# Patient Record
Sex: FEMALE | Race: BLACK OR AFRICAN AMERICAN | Employment: FULL TIME | URBAN - METROPOLITAN AREA
[De-identification: names, ages, dates, MRNs, and addresses within clinical notes are randomized per-mention and may not be internally consistent; named-entity substitution may affect disease eponyms.]

---

## 2019-08-07 ENCOUNTER — OFFICE VISIT (OUTPATIENT)
Dept: ORTHOPEDIC SURGERY | Age: 54
End: 2019-08-07
Payer: COMMERCIAL

## 2019-08-07 VITALS
SYSTOLIC BLOOD PRESSURE: 145 MMHG | DIASTOLIC BLOOD PRESSURE: 94 MMHG | HEART RATE: 74 BPM | HEIGHT: 64 IN | WEIGHT: 245.8 LBS | BODY MASS INDEX: 41.96 KG/M2

## 2019-08-07 DIAGNOSIS — M23.41 LOOSE BODY IN KNEE, RIGHT: Primary | ICD-10-CM

## 2019-08-07 DIAGNOSIS — M17.11 PRIMARY OSTEOARTHRITIS OF RIGHT KNEE: ICD-10-CM

## 2019-08-07 PROCEDURE — 99203 OFFICE O/P NEW LOW 30 MIN: CPT | Performed by: PHYSICIAN ASSISTANT

## 2019-08-07 RX ORDER — AMLODIPINE BESYLATE 5 MG/1
5 TABLET ORAL DAILY
COMMUNITY

## 2019-08-07 RX ORDER — IBUPROFEN 800 MG/1
800 TABLET ORAL EVERY 6 HOURS PRN
COMMUNITY
Start: 2019-08-04

## 2019-08-07 RX ORDER — PREDNISONE 20 MG/1
TABLET ORAL
Status: ON HOLD | COMMUNITY
Start: 2019-08-04 | End: 2019-08-14

## 2019-08-07 RX ORDER — HYDROCODONE BITARTRATE AND ACETAMINOPHEN 5; 325 MG/1; MG/1
1 TABLET ORAL DAILY PRN
COMMUNITY
Start: 2019-08-04 | End: 2019-08-13

## 2019-08-07 ASSESSMENT — ENCOUNTER SYMPTOMS
NAUSEA: 0
DIARRHEA: 0
CHEST TIGHTNESS: 0
ABDOMINAL PAIN: 0
SHORTNESS OF BREATH: 0
COUGH: 0
VOMITING: 0
ABDOMINAL DISTENTION: 0
COLOR CHANGE: 0
CONSTIPATION: 0
APNEA: 0

## 2019-08-07 NOTE — PROGRESS NOTES
and shortness of breath. Cardiovascular: Negative for chest pain, palpitations and leg swelling. Gastrointestinal: Negative for abdominal distention, abdominal pain, constipation, diarrhea, nausea and vomiting. Genitourinary: Negative for difficulty urinating, dysuria and hematuria. Musculoskeletal: Positive for arthralgias and joint swelling. Negative for gait problem and myalgias. Skin: Negative for color change and rash. Neurological: Negative for dizziness, weakness, numbness and headaches. Psychiatric/Behavioral: Positive for sleep disturbance. Past Medical History:    History reviewed. No pertinent past medical history. Past Surgical History:    History reviewed. No pertinent surgical history. CurrentMedications:   Current Outpatient Medications   Medication Sig Dispense Refill    HYDROcodone-acetaminophen (NORCO) 5-325 MG per tablet Take 1 tablet by mouth daily as needed.  Bictegravir-Emtricitab-Tenofov (BIKTARVY) -25 MG TABS Take 1 tablet by mouth      ibuprofen (ADVIL;MOTRIN) 800 MG tablet Take 800 mg by mouth      predniSONE (DELTASONE) 20 MG tablet 60mg x 3d, 40mg x3d, 20mg x3d, 10mg x4d      amLODIPine (NORVASC) 5 MG tablet Take 5 mg by mouth daily       No current facility-administered medications for this visit. Allergies:    Patient has no known allergies.     Social History:   Social History     Socioeconomic History    Marital status:      Spouse name: None    Number of children: None    Years of education: None    Highest education level: None   Occupational History    None   Social Needs    Financial resource strain: None    Food insecurity:     Worry: None     Inability: None    Transportation needs:     Medical: None     Non-medical: None   Tobacco Use    Smoking status: Never Smoker    Smokeless tobacco: Never Used   Substance and Sexual Activity    Alcohol use: None    Drug use: None    Sexual activity: None   Lifestyle   

## 2019-08-08 ENCOUNTER — HOSPITAL ENCOUNTER (OUTPATIENT)
Dept: MRI IMAGING | Age: 54
Discharge: HOME | End: 2019-08-10
Payer: COMMERCIAL

## 2019-08-08 DIAGNOSIS — M23.41 LOOSE BODY IN KNEE, RIGHT: ICD-10-CM

## 2019-08-08 PROCEDURE — 73721 MRI JNT OF LWR EXTRE W/O DYE: CPT

## 2019-08-12 ENCOUNTER — OFFICE VISIT (OUTPATIENT)
Dept: ORTHOPEDIC SURGERY | Age: 54
End: 2019-08-12
Payer: COMMERCIAL

## 2019-08-12 VITALS
DIASTOLIC BLOOD PRESSURE: 82 MMHG | WEIGHT: 245.6 LBS | HEART RATE: 67 BPM | HEIGHT: 64 IN | BODY MASS INDEX: 41.93 KG/M2 | SYSTOLIC BLOOD PRESSURE: 131 MMHG

## 2019-08-12 DIAGNOSIS — M17.11 PRIMARY OSTEOARTHRITIS OF RIGHT KNEE: ICD-10-CM

## 2019-08-12 DIAGNOSIS — M23.41 LOOSE BODY IN KNEE, RIGHT: Primary | ICD-10-CM

## 2019-08-12 PROCEDURE — 99214 OFFICE O/P EST MOD 30 MIN: CPT | Performed by: ORTHOPAEDIC SURGERY

## 2019-08-12 ASSESSMENT — ENCOUNTER SYMPTOMS
ABDOMINAL PAIN: 0
SHORTNESS OF BREATH: 0
DIARRHEA: 0
COUGH: 0
CONSTIPATION: 0
CHEST TIGHTNESS: 0
COLOR CHANGE: 0
NAUSEA: 0
APNEA: 0
VOMITING: 0
ABDOMINAL DISTENTION: 0
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
## 2019-08-13 ENCOUNTER — HOSPITAL ENCOUNTER (OUTPATIENT)
Dept: PREADMISSION TESTING | Age: 54
Discharge: HOME | End: 2019-08-17
Payer: COMMERCIAL

## 2019-08-13 VITALS
WEIGHT: 245.59 LBS | HEIGHT: 64 IN | DIASTOLIC BLOOD PRESSURE: 91 MMHG | BODY MASS INDEX: 41.93 KG/M2 | HEART RATE: 69 BPM | SYSTOLIC BLOOD PRESSURE: 155 MMHG | RESPIRATION RATE: 16 BRPM | OXYGEN SATURATION: 100 %

## 2019-08-13 LAB
ABSOLUTE EOS #: 0.05 K/UL (ref 0–0.44)
ABSOLUTE IMMATURE GRANULOCYTE: 0.02 K/UL (ref 0–0.3)
ABSOLUTE LYMPH #: 2.7 K/UL (ref 1.1–3.7)
ABSOLUTE MONO #: 0.54 K/UL (ref 0.1–1.2)
ANION GAP SERPL CALCULATED.3IONS-SCNC: 11 MMOL/L (ref 9–17)
BASOPHILS # BLD: 1 % (ref 0–2)
BASOPHILS ABSOLUTE: 0.03 K/UL (ref 0–0.2)
BUN BLDV-MCNC: 18 MG/DL (ref 6–20)
CHLORIDE BLD-SCNC: 101 MMOL/L (ref 98–107)
CO2: 28 MMOL/L (ref 20–31)
CREAT SERPL-MCNC: 0.71 MG/DL (ref 0.5–0.9)
DIFFERENTIAL TYPE: NORMAL
EOSINOPHILS RELATIVE PERCENT: 1 % (ref 1–4)
GFR AFRICAN AMERICAN: >60 ML/MIN
GFR NON-AFRICAN AMERICAN: >60 ML/MIN
GFR SERPL CREATININE-BSD FRML MDRD: NORMAL ML/MIN/{1.73_M2}
GFR SERPL CREATININE-BSD FRML MDRD: NORMAL ML/MIN/{1.73_M2}
HCT VFR BLD CALC: 40 % (ref 36.3–47.1)
HEMOGLOBIN: 13.1 G/DL (ref 11.9–15.1)
IMMATURE GRANULOCYTES: 0 %
LYMPHOCYTES # BLD: 41 % (ref 24–43)
MCH RBC QN AUTO: 31.4 PG (ref 25.2–33.5)
MCHC RBC AUTO-ENTMCNC: 32.8 G/DL (ref 28.4–34.8)
MCV RBC AUTO: 95.9 FL (ref 82.6–102.9)
MONOCYTES # BLD: 8 % (ref 3–12)
NRBC AUTOMATED: 0 PER 100 WBC
PDW BLD-RTO: 12.8 % (ref 11.8–14.4)
PLATELET # BLD: 298 K/UL (ref 138–453)
PLATELET ESTIMATE: NORMAL
PMV BLD AUTO: 9.7 FL (ref 8.1–13.5)
POTASSIUM SERPL-SCNC: 3.3 MMOL/L (ref 3.7–5.3)
RBC # BLD: 4.17 M/UL (ref 3.95–5.11)
RBC # BLD: NORMAL 10*6/UL
SEG NEUTROPHILS: 49 % (ref 36–65)
SEGMENTED NEUTROPHILS ABSOLUTE COUNT: 3.21 K/UL (ref 1.5–8.1)
SODIUM BLD-SCNC: 140 MMOL/L (ref 135–144)
WBC # BLD: 6.6 K/UL (ref 3.5–11.3)
WBC # BLD: NORMAL 10*3/UL

## 2019-08-13 PROCEDURE — 85025 COMPLETE CBC W/AUTO DIFF WBC: CPT

## 2019-08-13 PROCEDURE — 82565 ASSAY OF CREATININE: CPT

## 2019-08-13 PROCEDURE — 80051 ELECTROLYTE PANEL: CPT

## 2019-08-13 PROCEDURE — 36415 COLL VENOUS BLD VENIPUNCTURE: CPT

## 2019-08-13 PROCEDURE — 93005 ELECTROCARDIOGRAM TRACING: CPT | Performed by: ANESTHESIOLOGY

## 2019-08-13 PROCEDURE — 84520 ASSAY OF UREA NITROGEN: CPT

## 2019-08-13 ASSESSMENT — PAIN - FUNCTIONAL ASSESSMENT: PAIN_FUNCTIONAL_ASSESSMENT: PREVENTS OR INTERFERES SOME ACTIVE ACTIVITIES AND ADLS

## 2019-08-13 ASSESSMENT — PAIN DESCRIPTION - FREQUENCY: FREQUENCY: CONTINUOUS

## 2019-08-13 ASSESSMENT — PAIN DESCRIPTION - DESCRIPTORS: DESCRIPTORS: SHARP;STABBING;SHOOTING

## 2019-08-13 ASSESSMENT — PAIN DESCRIPTION - LOCATION: LOCATION: KNEE

## 2019-08-13 ASSESSMENT — PAIN DESCRIPTION - ONSET: ONSET: AWAKENED FROM SLEEP

## 2019-08-13 ASSESSMENT — PAIN DESCRIPTION - PAIN TYPE: TYPE: ACUTE PAIN

## 2019-08-13 ASSESSMENT — PAIN DESCRIPTION - ORIENTATION: ORIENTATION: RIGHT

## 2019-08-13 ASSESSMENT — PAIN SCALES - GENERAL: PAINLEVEL_OUTOF10: 8

## 2019-08-13 ASSESSMENT — PAIN DESCRIPTION - PROGRESSION: CLINICAL_PROGRESSION: GRADUALLY WORSENING

## 2019-08-13 NOTE — PRE-PROCEDURE INSTRUCTIONS
[facility] ON Friday, August 16th at 06:00 AM    Once you enter the hospital lobby, take the elevators to the second floor. Check-In is at the surgery registration desk. If you have been given a blood band, you must bring it with you the day of surgery. Continue to take your home medications as you normally do up to and including the night before surgery with the exception of any blood thinning medications. Please stop any blood thinning medications as directed by your surgeon or prescribing physician. Failure to stop certain medications may interfere with your scheduled surgery. These may include:  Aspirin, Warfarin (Coumadin), Clopidogrel (Plavix), Ibuprofen (Motrin, Advil), Naproxen (Aleve), Meloxicam (Mobic), Celecoxib (Celebrex), Eliquis, Pradaxa, Xarelto, Effient, Fish Oil, Herbal supplements. If you are diabetic, do not take any of your diabetic medications by mouth the morning of surgery. If you are taking insulin contact the doctor that manages your diabetes for instructions about any changes to your insulin dosages the day before surgery. Do not inject insulin or other injectable diabetic medications the morning of surgery unless otherwise instructed by the doctor who manages your diabetes. Please take the following medication(s) the day of surgery with a small sip of water:  amlodipine  Please use your inhalers at home the day of surgery. PREPARING FOR YOUR SURGERY:     Before surgery, you can play an important role in your own health. Because skin is not sterile, we need to be sure that your skin is as free of germs as possible before surgery by carefully washing before surgery. Preparing or prepping skin before surgery can reduce the risk of a surgical site infection.   Do not shave the area of your body where your surgery will be performed unless you received specific permission from your physician.     You will need to shower at home the night before
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
## 2019-08-14 ENCOUNTER — HOSPITAL ENCOUNTER (OUTPATIENT)
Age: 54
Setting detail: OUTPATIENT SURGERY
Discharge: HOME | End: 2019-08-14
Attending: ORTHOPAEDIC SURGERY | Admitting: ORTHOPAEDIC SURGERY
Payer: COMMERCIAL

## 2019-08-14 ENCOUNTER — ANESTHESIA EVENT (OUTPATIENT)
Dept: OPERATING ROOM | Age: 54
End: 2019-08-14
Payer: COMMERCIAL

## 2019-08-14 ENCOUNTER — APPOINTMENT (OUTPATIENT)
Dept: GENERAL RADIOLOGY | Age: 54
End: 2019-08-14
Attending: ORTHOPAEDIC SURGERY
Payer: COMMERCIAL

## 2019-08-14 ENCOUNTER — ANESTHESIA (OUTPATIENT)
Dept: OPERATING ROOM | Age: 54
End: 2019-08-14
Payer: COMMERCIAL

## 2019-08-14 VITALS
DIASTOLIC BLOOD PRESSURE: 78 MMHG | OXYGEN SATURATION: 99 % | RESPIRATION RATE: 17 BRPM | HEART RATE: 77 BPM | TEMPERATURE: 97 F | BODY MASS INDEX: 41.83 KG/M2 | HEIGHT: 64 IN | SYSTOLIC BLOOD PRESSURE: 132 MMHG | WEIGHT: 245 LBS

## 2019-08-14 VITALS — TEMPERATURE: 98.4 F | SYSTOLIC BLOOD PRESSURE: 116 MMHG | DIASTOLIC BLOOD PRESSURE: 60 MMHG | OXYGEN SATURATION: 99 %

## 2019-08-14 DIAGNOSIS — G89.18 POST-OP PAIN: Primary | ICD-10-CM

## 2019-08-14 LAB
EKG ATRIAL RATE: 70 BPM
EKG P AXIS: 58 DEGREES
EKG P-R INTERVAL: 168 MS
EKG Q-T INTERVAL: 396 MS
EKG QRS DURATION: 88 MS
EKG QTC CALCULATION (BAZETT): 427 MS
EKG R AXIS: 41 DEGREES
EKG T AXIS: 45 DEGREES
EKG VENTRICULAR RATE: 70 BPM

## 2019-08-14 PROCEDURE — 7100000011 HC PHASE II RECOVERY - ADDTL 15 MIN: Performed by: ORTHOPAEDIC SURGERY

## 2019-08-14 PROCEDURE — 3700000001 HC ADD 15 MINUTES (ANESTHESIA): Performed by: ORTHOPAEDIC SURGERY

## 2019-08-14 PROCEDURE — 2500000003 HC RX 250 WO HCPCS: Performed by: NURSE ANESTHETIST, CERTIFIED REGISTERED

## 2019-08-14 PROCEDURE — 73560 X-RAY EXAM OF KNEE 1 OR 2: CPT

## 2019-08-14 PROCEDURE — 6360000002 HC RX W HCPCS: Performed by: ORTHOPAEDIC SURGERY

## 2019-08-14 PROCEDURE — 7100000010 HC PHASE II RECOVERY - FIRST 15 MIN: Performed by: ORTHOPAEDIC SURGERY

## 2019-08-14 PROCEDURE — 2580000003 HC RX 258: Performed by: NURSE ANESTHETIST, CERTIFIED REGISTERED

## 2019-08-14 PROCEDURE — 29874 ARTHRS KNEE SURG RMV LOOS/FB: CPT | Performed by: ORTHOPAEDIC SURGERY

## 2019-08-14 PROCEDURE — 3600000013 HC SURGERY LEVEL 3 ADDTL 15MIN: Performed by: ORTHOPAEDIC SURGERY

## 2019-08-14 PROCEDURE — 2709999900 HC NON-CHARGEABLE SUPPLY: Performed by: ORTHOPAEDIC SURGERY

## 2019-08-14 PROCEDURE — 6370000000 HC RX 637 (ALT 250 FOR IP): Performed by: ANESTHESIOLOGY

## 2019-08-14 PROCEDURE — 6360000002 HC RX W HCPCS: Performed by: NURSE ANESTHETIST, CERTIFIED REGISTERED

## 2019-08-14 PROCEDURE — 7100000001 HC PACU RECOVERY - ADDTL 15 MIN: Performed by: ORTHOPAEDIC SURGERY

## 2019-08-14 PROCEDURE — 29879 ARTHRS KNE SRG ABRASJ ARTHRP: CPT | Performed by: ORTHOPAEDIC SURGERY

## 2019-08-14 PROCEDURE — 2580000003 HC RX 258: Performed by: ANESTHESIOLOGY

## 2019-08-14 PROCEDURE — 2580000003 HC RX 258: Performed by: ORTHOPAEDIC SURGERY

## 2019-08-14 PROCEDURE — 6360000002 HC RX W HCPCS: Performed by: ANESTHESIOLOGY

## 2019-08-14 PROCEDURE — 7100000000 HC PACU RECOVERY - FIRST 15 MIN: Performed by: ORTHOPAEDIC SURGERY

## 2019-08-14 PROCEDURE — 3700000000 HC ANESTHESIA ATTENDED CARE: Performed by: ORTHOPAEDIC SURGERY

## 2019-08-14 PROCEDURE — 3600000003 HC SURGERY LEVEL 3 BASE: Performed by: ORTHOPAEDIC SURGERY

## 2019-08-14 RX ORDER — SODIUM CHLORIDE, SODIUM LACTATE, POTASSIUM CHLORIDE, CALCIUM CHLORIDE 600; 310; 30; 20 MG/100ML; MG/100ML; MG/100ML; MG/100ML
INJECTION, SOLUTION INTRAVENOUS CONTINUOUS
Status: DISCONTINUED | OUTPATIENT
Start: 2019-08-14 | End: 2019-08-14 | Stop reason: HOSPADM

## 2019-08-14 RX ORDER — FENTANYL CITRATE 50 UG/ML
INJECTION, SOLUTION INTRAMUSCULAR; INTRAVENOUS PRN
Status: DISCONTINUED | OUTPATIENT
Start: 2019-08-14 | End: 2019-08-14 | Stop reason: SDUPTHER

## 2019-08-14 RX ORDER — FENTANYL CITRATE 50 UG/ML
25 INJECTION, SOLUTION INTRAMUSCULAR; INTRAVENOUS EVERY 5 MIN PRN
Status: DISCONTINUED | OUTPATIENT
Start: 2019-08-14 | End: 2019-08-14 | Stop reason: HOSPADM

## 2019-08-14 RX ORDER — DEXAMETHASONE SODIUM PHOSPHATE 10 MG/ML
INJECTION INTRAMUSCULAR; INTRAVENOUS PRN
Status: DISCONTINUED | OUTPATIENT
Start: 2019-08-14 | End: 2019-08-14 | Stop reason: SDUPTHER

## 2019-08-14 RX ORDER — FENTANYL CITRATE 50 UG/ML
50 INJECTION, SOLUTION INTRAMUSCULAR; INTRAVENOUS EVERY 5 MIN PRN
Status: DISCONTINUED | OUTPATIENT
Start: 2019-08-14 | End: 2019-08-14 | Stop reason: HOSPADM

## 2019-08-14 RX ORDER — LIDOCAINE HYDROCHLORIDE 20 MG/ML
INJECTION, SOLUTION EPIDURAL; INFILTRATION; INTRACAUDAL; PERINEURAL PRN
Status: DISCONTINUED | OUTPATIENT
Start: 2019-08-14 | End: 2019-08-14 | Stop reason: SDUPTHER

## 2019-08-14 RX ORDER — ONDANSETRON 2 MG/ML
INJECTION INTRAMUSCULAR; INTRAVENOUS PRN
Status: DISCONTINUED | OUTPATIENT
Start: 2019-08-14 | End: 2019-08-14 | Stop reason: SDUPTHER

## 2019-08-14 RX ORDER — PROPOFOL 10 MG/ML
INJECTION, EMULSION INTRAVENOUS PRN
Status: DISCONTINUED | OUTPATIENT
Start: 2019-08-14 | End: 2019-08-14 | Stop reason: SDUPTHER

## 2019-08-14 RX ORDER — MIDAZOLAM HYDROCHLORIDE 1 MG/ML
INJECTION INTRAMUSCULAR; INTRAVENOUS PRN
Status: DISCONTINUED | OUTPATIENT
Start: 2019-08-14 | End: 2019-08-14 | Stop reason: SDUPTHER

## 2019-08-14 RX ORDER — OXYCODONE HYDROCHLORIDE AND ACETAMINOPHEN 5; 325 MG/1; MG/1
1 TABLET ORAL
Status: COMPLETED | OUTPATIENT
Start: 2019-08-14 | End: 2019-08-14

## 2019-08-14 RX ORDER — ROPIVACAINE HYDROCHLORIDE 5 MG/ML
INJECTION, SOLUTION EPIDURAL; INFILTRATION; PERINEURAL PRN
Status: DISCONTINUED | OUTPATIENT
Start: 2019-08-14 | End: 2019-08-14 | Stop reason: ALTCHOICE

## 2019-08-14 RX ORDER — OXYCODONE HYDROCHLORIDE AND ACETAMINOPHEN 5; 325 MG/1; MG/1
1 TABLET ORAL EVERY 4 HOURS PRN
Qty: 40 TABLET | Refills: 0 | Status: SHIPPED | OUTPATIENT
Start: 2019-08-14 | End: 2019-08-21

## 2019-08-14 RX ORDER — ONDANSETRON 2 MG/ML
4 INJECTION INTRAMUSCULAR; INTRAVENOUS
Status: DISCONTINUED | OUTPATIENT
Start: 2019-08-14 | End: 2019-08-14 | Stop reason: HOSPADM

## 2019-08-14 RX ORDER — DOCUSATE SODIUM 100 MG/1
100 CAPSULE, LIQUID FILLED ORAL 2 TIMES DAILY PRN
Qty: 60 CAPSULE | Refills: 0 | Status: SHIPPED | OUTPATIENT
Start: 2019-08-14

## 2019-08-14 RX ORDER — SODIUM CHLORIDE, SODIUM LACTATE, POTASSIUM CHLORIDE, CALCIUM CHLORIDE 600; 310; 30; 20 MG/100ML; MG/100ML; MG/100ML; MG/100ML
INJECTION, SOLUTION INTRAVENOUS CONTINUOUS PRN
Status: DISCONTINUED | OUTPATIENT
Start: 2019-08-14 | End: 2019-08-14 | Stop reason: SDUPTHER

## 2019-08-14 RX ADMIN — LIDOCAINE HYDROCHLORIDE 100 MG: 20 INJECTION, SOLUTION EPIDURAL; INFILTRATION; INTRACAUDAL; PERINEURAL at 12:20

## 2019-08-14 RX ADMIN — ONDANSETRON 4 MG: 2 INJECTION, SOLUTION INTRAMUSCULAR; INTRAVENOUS at 13:54

## 2019-08-14 RX ADMIN — DEXAMETHASONE SODIUM PHOSPHATE 10 MG: 10 INJECTION INTRAMUSCULAR; INTRAVENOUS at 12:20

## 2019-08-14 RX ADMIN — Medication 50 MCG: at 13:26

## 2019-08-14 RX ADMIN — PROPOFOL 200 MG: 10 INJECTION, EMULSION INTRAVENOUS at 12:20

## 2019-08-14 RX ADMIN — MIDAZOLAM 2 MG: 1 INJECTION INTRAMUSCULAR; INTRAVENOUS at 12:14

## 2019-08-14 RX ADMIN — FENTANYL CITRATE 25 MCG: 50 INJECTION, SOLUTION INTRAMUSCULAR; INTRAVENOUS at 14:46

## 2019-08-14 RX ADMIN — Medication 50 MCG: at 13:53

## 2019-08-14 RX ADMIN — CEFAZOLIN SODIUM 2 G: 10 INJECTION, POWDER, FOR SOLUTION INTRAVENOUS at 12:25

## 2019-08-14 RX ADMIN — SODIUM CHLORIDE, POTASSIUM CHLORIDE, SODIUM LACTATE AND CALCIUM CHLORIDE: 600; 310; 30; 20 INJECTION, SOLUTION INTRAVENOUS at 12:14

## 2019-08-14 RX ADMIN — Medication 100 MCG: at 12:20

## 2019-08-14 RX ADMIN — OXYCODONE AND ACETAMINOPHEN 1 TABLET: 5; 325 TABLET ORAL at 15:44

## 2019-08-14 RX ADMIN — SODIUM CHLORIDE, POTASSIUM CHLORIDE, SODIUM LACTATE AND CALCIUM CHLORIDE: 600; 310; 30; 20 INJECTION, SOLUTION INTRAVENOUS at 11:33

## 2019-08-14 RX ADMIN — FENTANYL CITRATE 25 MCG: 50 INJECTION, SOLUTION INTRAMUSCULAR; INTRAVENOUS at 14:31

## 2019-08-14 ASSESSMENT — PULMONARY FUNCTION TESTS
PIF_VALUE: 20
PIF_VALUE: 25
PIF_VALUE: 20
PIF_VALUE: 21
PIF_VALUE: 27
PIF_VALUE: 20
PIF_VALUE: 31
PIF_VALUE: 39
PIF_VALUE: 20
PIF_VALUE: 39
PIF_VALUE: 30
PIF_VALUE: 1
PIF_VALUE: 20
PIF_VALUE: 25
PIF_VALUE: 27
PIF_VALUE: 1
PIF_VALUE: 20
PIF_VALUE: 2
PIF_VALUE: 33
PIF_VALUE: 20
PIF_VALUE: 34
PIF_VALUE: 1
PIF_VALUE: 20
PIF_VALUE: 1
PIF_VALUE: 29
PIF_VALUE: 20
PIF_VALUE: 11
PIF_VALUE: 20
PIF_VALUE: 20
PIF_VALUE: 28
PIF_VALUE: 4
PIF_VALUE: 1
PIF_VALUE: 20
PIF_VALUE: 20
PIF_VALUE: 18
PIF_VALUE: 25
PIF_VALUE: 20
PIF_VALUE: 20
PIF_VALUE: 37
PIF_VALUE: 20
PIF_VALUE: 21
PIF_VALUE: 28
PIF_VALUE: 4
PIF_VALUE: 18
PIF_VALUE: 20
PIF_VALUE: 32
PIF_VALUE: 20
PIF_VALUE: 0
PIF_VALUE: 15
PIF_VALUE: 20
PIF_VALUE: 23
PIF_VALUE: 36
PIF_VALUE: 20
PIF_VALUE: 20
PIF_VALUE: 1
PIF_VALUE: 27
PIF_VALUE: 20
PIF_VALUE: 1
PIF_VALUE: 20
PIF_VALUE: 27
PIF_VALUE: 20
PIF_VALUE: 36
PIF_VALUE: 20
PIF_VALUE: 20
PIF_VALUE: 31
PIF_VALUE: 20
PIF_VALUE: 10
PIF_VALUE: 20
PIF_VALUE: 1
PIF_VALUE: 20
PIF_VALUE: 20
PIF_VALUE: 4
PIF_VALUE: 31
PIF_VALUE: 20
PIF_VALUE: 20
PIF_VALUE: 1
PIF_VALUE: 10
PIF_VALUE: 21
PIF_VALUE: 20

## 2019-08-14 ASSESSMENT — PAIN DESCRIPTION - LOCATION
LOCATION: KNEE

## 2019-08-14 ASSESSMENT — PAIN SCALES - GENERAL
PAINLEVEL_OUTOF10: 5
PAINLEVEL_OUTOF10: 6
PAINLEVEL_OUTOF10: 10
PAINLEVEL_OUTOF10: 6

## 2019-08-14 ASSESSMENT — PAIN DESCRIPTION - ORIENTATION
ORIENTATION: RIGHT

## 2019-08-14 ASSESSMENT — PAIN - FUNCTIONAL ASSESSMENT
PAIN_FUNCTIONAL_ASSESSMENT: PREVENTS OR INTERFERES WITH ALL ACTIVE AND SOME PASSIVE ACTIVITIES
PAIN_FUNCTIONAL_ASSESSMENT: PREVENTS OR INTERFERES WITH ALL ACTIVE AND SOME PASSIVE ACTIVITIES
PAIN_FUNCTIONAL_ASSESSMENT: 0-10
PAIN_FUNCTIONAL_ASSESSMENT: PREVENTS OR INTERFERES WITH ALL ACTIVE AND SOME PASSIVE ACTIVITIES
PAIN_FUNCTIONAL_ASSESSMENT: PREVENTS OR INTERFERES WITH ALL ACTIVE AND SOME PASSIVE ACTIVITIES

## 2019-08-14 ASSESSMENT — PAIN DESCRIPTION - DESCRIPTORS
DESCRIPTORS: ACHING

## 2019-08-14 ASSESSMENT — PAIN DESCRIPTION - PAIN TYPE
TYPE: SURGICAL PAIN

## 2019-08-14 ASSESSMENT — PAIN DESCRIPTION - PROGRESSION: CLINICAL_PROGRESSION: GRADUALLY WORSENING

## 2019-08-14 NOTE — H&P
pregnancy. Past Surgical History:      Past Surgical History         Past Surgical History:   Procedure Laterality Date    HYSTERECTOMY        OTHER SURGICAL HISTORY         polyps removed from the vocal cords    RECTAL SURGERY         boil removed x 3     TONSILLECTOMY AND ADENOIDECTOMY                Medications Prior to Admission:      Home Medications           Prior to Admission medications    Medication Sig Start Date End Date Taking? Authorizing Provider   amLODIPine (NORVASC) 5 MG tablet Take 5 mg by mouth daily       Historical Provider, MD   Bictegravir-Emtricitab-Tenofov (BIKTARVY) -25 MG TABS Take 1 tablet by mouth nightly  6/10/19     Historical Provider, MD   ibuprofen (ADVIL;MOTRIN) 800 MG tablet Take 800 mg by mouth every 6 hours as needed  8/4/19     Historical Provider, MD   predniSONE (DELTASONE) 20 MG tablet 60mg x 3d, 40mg x3d, 20mg x3d, 10mg x4d 8/4/19     Historical Provider, MD            Allergies:      Patient has no known allergies. Social History:      Tobacco:    reports that she has never smoked. She has never used smokeless tobacco.  Alcohol:      reports that she drinks alcohol. Drug Use:  reports that she does not use drugs. Safety: Recommended a shower chair. Functional Capacity:              1) Pt is able to walk 2 city blocks or more on level ground without SOB. 2) Pt is able to climb 2 flights of stairs without SOB. 3) Pt is able to walk up a hill for 1-2 city blocks without SOB. Family History:      Family History         Family History   Problem Relation Age of Onset    Heart Disease Mother      Hypertension Mother      Hypertension Father      Diabetes Neg Hx      Cancer Neg Hx              Review of Systems:      Positive and Negative as described in HPI. CONSTITUTIONAL: Negative for fevers, chills, sweats, fatigue, and weight loss. HEENT: Pt wears glasses.  Negative for hearing changes, rhinorrhea, and cranial nerves II through XII grossly intact. Strength 5/5 bilaterally. Skin:  No gross lesions, rashes, bruising, or bleeding on exposed skin area. Extremities: Bilateral ankle 1+ pitting edema. No knee tenderness. Posterior tibial pulses 2+ bilaterally. No calf tenderness with palpation. Psych: Normal affect.       Investigations:       Laboratory Testing:  Recent Results         Recent Results (from the past 24 hour(s))   CBC Auto Differential     Collection Time: 08/13/19  1:02 PM   Result Value Ref Range     WBC 6.6 3.5 - 11.3 k/uL     RBC 4.17 3.95 - 5.11 m/uL     Hemoglobin 13.1 11.9 - 15.1 g/dL     Hematocrit 40.0 36.3 - 47.1 %     MCV 95.9 82.6 - 102.9 fL     MCH 31.4 25.2 - 33.5 pg     MCHC 32.8 28.4 - 34.8 g/dL     RDW 12.8 11.8 - 14.4 %     Platelets 912 287 - 331 k/uL     MPV 9.7 8.1 - 13.5 fL     NRBC Automated 0.0 0.0 per 100 WBC     Differential Type NOT REPORTED       Seg Neutrophils 49 36 - 65 %     Lymphocytes 41 24 - 43 %     Monocytes 8 3 - 12 %     Eosinophils % 1 1 - 4 %     Basophils 1 0 - 2 %     Immature Granulocytes 0 0 %     Segs Absolute 3.21 1.50 - 8.10 k/uL     Absolute Lymph # 2.70 1.10 - 3.70 k/uL     Absolute Mono # 0.54 0.10 - 1.20 k/uL     Absolute Eos # 0.05 0.00 - 0.44 k/uL     Basophils # 0.03 0.00 - 0.20 k/uL     Absolute Immature Granulocyte 0.02 0.00 - 0.30 k/uL     WBC Morphology NOT REPORTED       RBC Morphology NOT REPORTED       Platelet Estimate NOT REPORTED     BUN & Creatinine     Collection Time: 08/13/19  1:02 PM   Result Value Ref Range     BUN 18 6 - 20 mg/dL     CREATININE 0.71 0.50 - 0.90 mg/dL     GFR Non-African American >60 >60 mL/min     GFR African American >60 >60 mL/min     GFR Comment            GFR Staging NOT REPORTED     Electrolyte Panel     Collection Time: 08/13/19  1:02 PM   Result Value Ref Range     Sodium 140 135 - 144 mmol/L     Potassium 3.3 (L) 3.7 - 5.3 mmol/L     Chloride 101 98 - 107 mmol/L     CO2 28 20 - 31 mmol/L     Anion Gap 11 9

## 2019-08-16 DIAGNOSIS — M23.41 LOOSE BODY IN KNEE, RIGHT: Primary | ICD-10-CM

## 2019-08-23 ENCOUNTER — OFFICE VISIT (OUTPATIENT)
Dept: ORTHOPEDIC SURGERY | Age: 54
End: 2019-08-23

## 2019-08-23 VITALS — HEIGHT: 64 IN | BODY MASS INDEX: 41.83 KG/M2 | WEIGHT: 245 LBS

## 2019-08-23 DIAGNOSIS — Z98.890 S/P ARTHROSCOPIC SURGERY OF RIGHT KNEE: Primary | ICD-10-CM

## 2019-08-23 DIAGNOSIS — M17.11 PRIMARY OSTEOARTHRITIS OF RIGHT KNEE: ICD-10-CM

## 2019-08-23 PROCEDURE — 99024 POSTOP FOLLOW-UP VISIT: CPT | Performed by: PHYSICIAN ASSISTANT

## 2019-08-23 ASSESSMENT — ENCOUNTER SYMPTOMS
CHEST TIGHTNESS: 0
APNEA: 0
NAUSEA: 0
DIARRHEA: 0
ABDOMINAL DISTENTION: 0
CONSTIPATION: 0
ABDOMINAL PAIN: 0
VOMITING: 0
COLOR CHANGE: 0
SHORTNESS OF BREATH: 0
COUGH: 0

## 2019-08-23 NOTE — PROGRESS NOTES
will work for her arthritic pain in the knee outside of injections unless she wants to do a total knee replacement. The patient then stated that weight loss is already part of her agenda and she does not plan on having a total knee replacement anytime soon. From there, we discussed and reviewed the surgical films and the operative report. I also gave her a copy of the operative report and the surgical films for her records. Lastly, I gave her a Tubigrip and I encouraged her to follow the RICE protocol to control her effusion. Patient was informed that they may now wash their incision with soap and water but they should refrain from submerging their incision due to the risk of infection. So that means no pools, baths, lakes, hot tubs or ponds. Patient should return to the office in 3 weeks to f/u with  Tad Gay PA-C, ATC. The patient will call the office immediately with any problems that may arise. No orders of the defined types were placed in this encounter. No orders of the defined types were placed in this encounter. Jacob Kern V, am scribing for and in the presence of Kulwinder Ramos ATC  8/25/2019  10:19 PM      I, Tad Gay PA-C, ATC,  have personally seen this patient, reviewed the chart including history, and imaging if done. I personally  performed the physical exam and obtained any needed additional history. I placed orders, performed or supervised procedures and developed the treatment plan.     Electronically signed by Siva Mojica PA-C, on 8/25/2019 at 10:20 PM      Electronically signed by Siva Mojica PA-C, on 8/25/2019 at 10:19 PM

## 2019-09-17 ENCOUNTER — OFFICE VISIT (OUTPATIENT)
Dept: ORTHOPEDIC SURGERY | Age: 54
End: 2019-09-17

## 2019-09-17 VITALS
SYSTOLIC BLOOD PRESSURE: 133 MMHG | WEIGHT: 244.93 LBS | HEART RATE: 79 BPM | BODY MASS INDEX: 41.82 KG/M2 | HEIGHT: 64 IN | DIASTOLIC BLOOD PRESSURE: 85 MMHG

## 2019-09-17 DIAGNOSIS — M23.41 LOOSE BODY IN KNEE, RIGHT: ICD-10-CM

## 2019-09-17 DIAGNOSIS — Z98.890 S/P ARTHROSCOPIC SURGERY OF RIGHT KNEE: Primary | ICD-10-CM

## 2019-09-17 PROCEDURE — 99024 POSTOP FOLLOW-UP VISIT: CPT | Performed by: PHYSICIAN ASSISTANT

## 2019-09-17 ASSESSMENT — ENCOUNTER SYMPTOMS
DIARRHEA: 0
NAUSEA: 0
CHEST TIGHTNESS: 0
COUGH: 0
ABDOMINAL DISTENTION: 0
VOMITING: 0
SHORTNESS OF BREATH: 0
APNEA: 0
COLOR CHANGE: 0
ABDOMINAL PAIN: 0
CONSTIPATION: 0

## 2019-10-17 ENCOUNTER — OFFICE VISIT (OUTPATIENT)
Dept: ORTHOPEDIC SURGERY | Age: 54
End: 2019-10-17

## 2019-10-17 VITALS
WEIGHT: 244.93 LBS | HEIGHT: 64 IN | BODY MASS INDEX: 41.82 KG/M2 | HEART RATE: 94 BPM | SYSTOLIC BLOOD PRESSURE: 127 MMHG | DIASTOLIC BLOOD PRESSURE: 81 MMHG

## 2019-10-17 DIAGNOSIS — Z98.890 S/P ARTHROSCOPIC SURGERY OF RIGHT KNEE: Primary | ICD-10-CM

## 2019-10-17 DIAGNOSIS — M17.11 PRIMARY OSTEOARTHRITIS OF RIGHT KNEE: ICD-10-CM

## 2019-10-17 PROCEDURE — 99024 POSTOP FOLLOW-UP VISIT: CPT | Performed by: ORTHOPAEDIC SURGERY

## 2019-10-17 ASSESSMENT — ENCOUNTER SYMPTOMS
VOMITING: 0
ABDOMINAL DISTENTION: 0
NAUSEA: 0
COLOR CHANGE: 0
CONSTIPATION: 0
CHEST TIGHTNESS: 0
APNEA: 0
ABDOMINAL PAIN: 0
COUGH: 0
SHORTNESS OF BREATH: 0
DIARRHEA: 0

## 2019-10-27 PROBLEM — M17.11 PRIMARY OSTEOARTHRITIS OF RIGHT KNEE: Status: ACTIVE | Noted: 2019-10-27

## 2019-10-27 PROBLEM — Z98.890 S/P ARTHROSCOPIC SURGERY OF RIGHT KNEE: Status: ACTIVE | Noted: 2019-10-27

## 2019-10-28 ENCOUNTER — TELEPHONE (OUTPATIENT)
Dept: ORTHOPEDIC SURGERY | Age: 54
End: 2019-10-28

## 2019-12-05 ENCOUNTER — OFFICE VISIT (OUTPATIENT)
Dept: ORTHOPEDIC SURGERY | Age: 54
End: 2019-12-05
Payer: COMMERCIAL

## 2019-12-05 VITALS
HEART RATE: 77 BPM | SYSTOLIC BLOOD PRESSURE: 126 MMHG | WEIGHT: 244.93 LBS | BODY MASS INDEX: 41.82 KG/M2 | DIASTOLIC BLOOD PRESSURE: 87 MMHG | HEIGHT: 64 IN

## 2019-12-05 DIAGNOSIS — M23.41 LOOSE BODY IN KNEE, RIGHT: ICD-10-CM

## 2019-12-05 DIAGNOSIS — Z98.890 S/P ARTHROSCOPIC SURGERY OF RIGHT KNEE: Primary | ICD-10-CM

## 2019-12-05 DIAGNOSIS — M17.11 PRIMARY OSTEOARTHRITIS OF RIGHT KNEE: ICD-10-CM

## 2019-12-05 PROCEDURE — 99213 OFFICE O/P EST LOW 20 MIN: CPT | Performed by: ORTHOPAEDIC SURGERY

## 2019-12-05 ASSESSMENT — ENCOUNTER SYMPTOMS
CONSTIPATION: 0
SHORTNESS OF BREATH: 0
VOMITING: 0
COUGH: 0
COLOR CHANGE: 0
DIARRHEA: 0
CHEST TIGHTNESS: 0
NAUSEA: 0
APNEA: 0
ABDOMINAL DISTENTION: 0
ABDOMINAL PAIN: 0

## 2023-03-08 DIAGNOSIS — M25.562 LEFT KNEE PAIN, UNSPECIFIED CHRONICITY: Primary | ICD-10-CM

## 2023-03-08 DIAGNOSIS — M25.561 RIGHT KNEE PAIN, UNSPECIFIED CHRONICITY: Primary | ICD-10-CM

## 2023-03-09 ENCOUNTER — OFFICE VISIT (OUTPATIENT)
Dept: ORTHOPEDIC SURGERY | Age: 58
End: 2023-03-09

## 2023-03-09 VITALS — HEIGHT: 64 IN | BODY MASS INDEX: 43.54 KG/M2 | RESPIRATION RATE: 16 BRPM | WEIGHT: 255 LBS

## 2023-03-09 DIAGNOSIS — M17.12 PRIMARY OSTEOARTHRITIS OF LEFT KNEE: Primary | ICD-10-CM

## 2023-03-09 RX ORDER — METHYLPREDNISOLONE ACETATE 80 MG/ML
80 INJECTION, SUSPENSION INTRA-ARTICULAR; INTRALESIONAL; INTRAMUSCULAR; SOFT TISSUE ONCE
Status: COMPLETED | OUTPATIENT
Start: 2023-03-09 | End: 2023-03-09

## 2023-03-09 RX ORDER — LIDOCAINE HYDROCHLORIDE 10 MG/ML
6 INJECTION, SOLUTION INFILTRATION; PERINEURAL ONCE
Status: COMPLETED | OUTPATIENT
Start: 2023-03-09 | End: 2023-03-09

## 2023-03-09 RX ADMIN — LIDOCAINE HYDROCHLORIDE 6 ML: 10 INJECTION, SOLUTION INFILTRATION; PERINEURAL at 16:09

## 2023-03-09 RX ADMIN — METHYLPREDNISOLONE ACETATE 80 MG: 80 INJECTION, SUSPENSION INTRA-ARTICULAR; INTRALESIONAL; INTRAMUSCULAR; SOFT TISSUE at 16:09

## 2023-03-09 ASSESSMENT — ENCOUNTER SYMPTOMS
VOMITING: 0
ABDOMINAL DISTENTION: 0
COLOR CHANGE: 0
RESPIRATORY NEGATIVE: 1
CHEST TIGHTNESS: 0
DIARRHEA: 0
ABDOMINAL PAIN: 0
APNEA: 0
SHORTNESS OF BREATH: 0
CONSTIPATION: 0
COUGH: 0
NAUSEA: 0

## 2023-03-09 NOTE — PROGRESS NOTES
[address] [place] [address] AND SPORTS MEDICINE  [address] [place] [id number]  Dept: [phone]  Dept Fax: [phone]          Left Knee - New Patient     Subjective:     Chief Complaint   Patient presents with    Knee Pain     Left     HPI:     Gustabo Pantoja presents today for Left knee pain. Occupation: [employer] teacher. The pain has been present for 3 weeks. The patient recalls a specific injury. The patient has tried ice, cane, rest, ibuprofen with no improvement. The pain is now described as Achy and Sharp. There is  pain on weight bearing. The knee has swelled. There is not painful popping and clicking. The knee has not caught or locked up. The knee has given out. It is  stiff upon arising from sitting. It is  painful to go up and down stairs and sit for a prolonged time. The patient has not had a cortisone injection. The patient has not tried a lubrication injection. The patient has not tried physical therapy. The patient has not had surgery. ROS:   Review of Systems   Constitutional:  Positive for activity change. Negative for appetite change, fatigue and fever. Respiratory: Negative. Negative for apnea, cough, chest tightness and shortness of breath. Cardiovascular: Negative. Negative for chest pain, palpitations and leg swelling. Gastrointestinal:  Negative for abdominal distention, abdominal pain, constipation, diarrhea, nausea and vomiting. Genitourinary:  Negative for difficulty urinating, dysuria and hematuria. Musculoskeletal:  Positive for arthralgias, gait problem and joint swelling. Negative for myalgias. Skin:  Negative for color change and rash. Neurological:  Negative for dizziness, weakness, numbness and headaches. Psychiatric/Behavioral:  Positive for sleep disturbance.       Past Medical History:    Past Medical History:   Diagnosis Date    Arthritis     HIV (human immunodeficiency virus infection) ([facility] [address]     Hypertension     Hypokalemia     During pregnancy. Past Surgical History:    Past Surgical History:   Procedure Laterality Date    HYSTERECTOMY (CERVIX STATUS UNKNOWN)      KNEE ARTHROSCOPY Right 08/14/2019    RIGHT KNEE ARTHROSCOPY WITH REMOVAL LOOSE BODIES AND DEBRIDEMENT, AND CHONDRAPLASTY (Right )    KNEE ARTHROSCOPY Right 8/14/2019    RIGHT KNEE ARTHROSCOPY WITH REMOVAL LOOSE BODIES AND DEBRIDEMENT, AND CHONDRAPLASTY performed by John Wiggins DO at [address] removed from the vocal cords    RECTAL SURGERY      boil removed x 3     TONSILLECTOMY AND ADENOIDECTOMY         CurrentMedications:   Current Outpatient Medications   Medication Sig Dispense Refill    ibuprofen (ADVIL;MOTRIN) 800 MG tablet Take 800 mg by mouth every 6 hours as needed       aspirin 81 MG tablet Take 1 tablet by mouth 2 times daily for 14 days 28 tablet 0    docusate sodium (COLACE) 100 MG capsule Take 1 capsule by mouth 2 times daily as needed for Constipation (Patient not taking: Reported on 12/5/2019) 60 capsule 0    amLODIPine (NORVASC) 5 MG tablet Take 5 mg by mouth daily      Bictegravir-Emtricitab-Tenofov (BIKTARVY) -25 MG TABS Take 1 tablet by mouth nightly        Current Facility-Administered Medications   Medication Dose Route Frequency Provider Last Rate Last Admin    lidocaine 1 % injection 6 mL  6 mL Intra-artICUlar Once Amanda Mabry PA-C        methylPREDNISolone acetate (DEPO-MEDROL) injection 80 mg  80 mg Intra-artICUlar Once Amanda Mabry PA-C           Allergies:    Patient has no known allergies.     Social History:   Social History     Socioeconomic History    Marital status:      Spouse name: None    Number of children: None    Years of education: None    Highest education level: None   Tobacco Use    Smoking status: Never    Smokeless tobacco: Never   Vaping Use    Vaping Use: Never used   Substance and Sexual Activity    Alcohol use: Yes     Comment: social    Drug use: Never       Family History:  Family History   Problem Relation Age of Onset    Heart Disease Mother     Hypertension Mother     Hypertension Father     Diabetes Neg Hx     Cancer Neg Hx        Vitals:   Resp 16   Ht 5' 4.02\" (1.626 m)   Wt 255 lb (115.7 kg)   BMI 43.74 kg/m²  Body mass index is 43.74 kg/m². Physical Examination:     Orthopedics:    GENERAL: Alert and oriented X3 in no acute distress. SKIN: Intact without lesions or ulcerations. NEURO: Intact to sensory and motor testing. VASC: Capillary refill is less than 3 seconds. KNEE EXAM    LOCATION: Left Knee  GEN: Alert and oriented X 3, in no acute distress. GAIT: The patient's gait was observed while entering the exam room and was noted to be antalgic. The extremity is in varus alignment. SKIN: Intact without rashes, lesions, or ulcerations. No obvious deformity or swelling. NEURO: The patient responds to light touch throughout bilateral LE. Patellar and Achilles reflexes are 2/4. VASC: The bilateral LE is neurovascularly intact with 2/4 DP and 2/4 PT pulses. Brisk capillary refill. ROM: 0/105 degrees. There is mild effusion. MUSC: slightly decreased quad tone  LIGAMENT: Lachman's test is Negative with Good endpoint. Anterior drawer Negative. Posterior drawer Negative. There is No varus instability at 0 degrees and No varus instability at 30 degrees. There is No valgus instability at 0 degrees and No valgus instability at 30 degrees. SPECIAL: Adam test is negative with no clunks, + crepitation, and + pain. PALP: There is medial joint line pain. Assessment:     1. Primary osteoarthritis of left knee      Procedures:    Procedure: yes    Joint aspiration of the left knee. The patient was placed in the supine position on the exam table. The superior lateral portal was identified and marked. The skin was prepped with betadine in a sterile fashion.  Utilizing ultrasound for precise placement and clean technique 4cc's of  1% lidocaine  was injected. There was no resistance to the injection. Thereafter the skin was prepped with betadine in a sterile fashion once again and the joint was aspirated with a 60cc syringe. After aspirating the joint, 5 cc's of yellow tinged synovial fluid was removed from the knee. The wound was then cleansed and a band-aid was placed over the superior lateral portal site. The patient tolerated the procedure without difficulty. Adverse reactions to the aspiration were discussed with the patient including signs of infection (increasing pain, redness, swelling) and the patient was instructed to call immediately if experiencing any of these symptoms. Regular Knee Injection    Location: Left Knee  Procedure: I discussed in detail the risks, benefits and complications of the corticosteroid injection which included but are not limited to: infection, skin reactions, hot swollen, and anaphylaxis with the patient. Lópezjenna Flowers verbalized understanding and they have agreed to have the corticosteroid injection into the left knee. The patient was placed in the Supine position on the exam table. The superior lateral portal was identified and marked with a ball point pen. The skin was prepped with betadine in a sterile fashion. Utilizing a [employer] ultrasound unit with a variable frequency linear transducer and clean technique with sterile gloves, a 3 cc solution containing 2 cc of 1% lidocaine   with 1 cc containing 80 mg of Depo-Medrol was injected. There was no resistance to the injection. The wound was cleansed and a band-aid was placed. the patient tolerated the procedure without difficulty. Adverse reactions to the injection were discussed with the patient including signs of infection (increasing pain, redness, swelling) and the patient was instructed to call immediately if experiencing any of these symptoms.  Images of the injection site were recorded throughout the procedure and are saved on the SD card which is stored in the [employer] ultrasound unit. All images were downloaded and stored in patient's chart. Radiology:   KNEE X-RAY    4 views of the left knee including AP, tunnel, and lateral in the upright position, and skyline views reveal varus alignment with no fracture or dislocation. Kellgren grade III changes of osteoarthritis (joint space narrowing, osteophyte, subchondral sclerosis, bony deformity/cyst) of the tricompartment(s). No osseous loose bodies. No bony erosion or periosteal reaction. No soft tissue masses. Spurring of the superior patella noted. Impression: Moderate arthritis of the left knee. Plan:   Treatment : I reviewed the x-ray with the patient and I informed them that the x-ray does have moderate arthritis of the left knee. We discussed the etiologies and natural histories of aggravation of pre-existing osteoarthritis. We discussed the various treatment alternatives including anti-inflammatory medications, physical therapy, injections, further imaging studies and as a last result surgery. During today's visit, and her other knee she did have a floating body that needed to be removed but I do not see anything like that x-ray. The quickest thing I can do is try to aspirate her knee and do a cortisone injection to take down inflammation and allow her to then go to physical therapy to work on range of motion and strengthening. We did have a long discussion regarding weight and how that can affect knee arthritis. Patient states she has gained quite a bit of weight since COVID and taking care of her mom and her . The patient has opted for a cortisone injection into the left knee to help reduce inflammation and pain. The injection site should never get red, hot, or swollen and if it does the patient will contact our office right away.  The patient may experience a increase in soreness the first 24-48 hours due to a cortisone flair and can take anti-inflammatories for a short period of time to reduce that soreness. The patient should not submerge the injection site in water for a minimum of 24 hours to avoid infection. This means no lakes, pools, ponds, or hot tubs for 24 hours. If the patient is diabetic the injection may increase their blood sugar for up to one week. The patient can do this cortisone injection once every 3-4 months as needed. If the injections stop working and do not give the patient relief the patient should consider surgical interventions to produce long term relief. . A physical therapy prescription was given. She does have a prescription for ibuprofen 800 that she can continue taking 3 times a day to see if that helps. The patient states she does not want to keep taking it. Patient should return to the clinic in 6 weeks to follow up with Rosey Galarza DO. The patient will call the office immediately with any problems. Orders Placed This Encounter   Medications    lidocaine 1 % injection 6 mL    methylPREDNISolone acetate (DEPO-MEDROL) injection 80 mg         Orders Placed This Encounter   Procedures    External Referral To Physical Therapy     Referral Priority:   Routine     Referral Type:   Eval and Treat     Referral Reason:   Specialty Services Required     Requested Specialty:   Physical Therapist     Number of Visits Requested:   1           This note is created with the assistance of a speech recognition program.  While intending to generate a document that actually reflects the content of the visit, the document can still have some errors including those of syntax and sound a like substitutions which may escape proof reading.   In such instances, actual meaning can be extrapolated by contextual diversion    Electronically signed by Melonie Bobby PA-C, on 3/9/2023 at 4:08 PM

## 2023-03-09 NOTE — PATIENT INSTRUCTIONS
INJECTION CARE    The injection site should never get red, hot, or swollen and if it does the patient will contact our office right away. With a cortisone steroid injection, the patient may experience a increase in soreness the first 24-48 hours due to a cortisone flair and can take anti-inflammatories for a short period of time to reduce that soreness. With a lubrication injection, on rare occasion the patient may develop swelling and pain if having a reaction to the medication. If this happens, try an anti-inflammatory medication and ice as needed. If pain and swelling continues, call the office for further instructions. The patient should not submerge the injection site in water for a minimum of 24 hours to avoid infection. This means no lakes, pools, ponds, or hot tubs for 24 hours. If the patient is diabetic the injection may increase their blood sugar for up to one week. The patient can do this cortisone injection once every 3 months as needed and lubrication injections every 6 months. [address] [place] [address] and Sports Medicine    Blossom Junior PA-C, ATC    Over the counter anti-inflammatory protocol (NSAIDS)    You may take the following over the counter medication for only 10-14 days to  reduce inflammation. If you develop an upset stomach, diarrhea, heartburn/GERD symptoms   discontinue immediately. If you need the medication on a long-term basis >greater than 10-14 days. Please contact your family doctor/PCP to discuss your options as you   will require ongoing medical monitoring.              Over the Counter/OTC             Ibuprofen/Advil/Motrin                                                                                                            200 mg tablets                  3-4 tables 3 times a day with food             OR            Naproxen Sodium, Aleve                    220 mg tablets          2 tablets 2 times a day with food           You May Add Tylenol extra strength, Acetaminophen           500 mg tablets               2 tablets every 8 hours    You may alternate with the NSAIDS for pain. NO more than 3000 mg of Tylenol/acetaminophen in 24 hours. '  Look at any OTC medications for added  Tylenol/acetaminophen--cold/sinus/flu medication.

## 2023-04-27 ENCOUNTER — OFFICE VISIT (OUTPATIENT)
Dept: ORTHOPEDIC SURGERY | Age: 58
End: 2023-04-27
Payer: COMMERCIAL

## 2023-04-27 VITALS — RESPIRATION RATE: 14 BRPM | HEIGHT: 64 IN | BODY MASS INDEX: 42.51 KG/M2 | WEIGHT: 249 LBS

## 2023-04-27 DIAGNOSIS — M17.12 PRIMARY OSTEOARTHRITIS OF LEFT KNEE: Primary | ICD-10-CM

## 2023-04-27 PROCEDURE — G8417 CALC BMI ABV UP PARAM F/U: HCPCS | Performed by: ORTHOPAEDIC SURGERY

## 2023-04-27 PROCEDURE — 3017F COLORECTAL CA SCREEN DOC REV: CPT | Performed by: ORTHOPAEDIC SURGERY

## 2023-04-27 PROCEDURE — G8427 DOCREV CUR MEDS BY ELIG CLIN: HCPCS | Performed by: ORTHOPAEDIC SURGERY

## 2023-04-27 PROCEDURE — 99214 OFFICE O/P EST MOD 30 MIN: CPT | Performed by: ORTHOPAEDIC SURGERY

## 2023-04-27 PROCEDURE — 1036F TOBACCO NON-USER: CPT | Performed by: ORTHOPAEDIC SURGERY

## 2023-04-27 ASSESSMENT — ENCOUNTER SYMPTOMS
EYE DISCHARGE: 0
ROS SKIN COMMENTS: NEGATIVE FOR RASH
SHORTNESS OF BREATH: 0
ABDOMINAL PAIN: 0

## 2023-04-27 NOTE — PROGRESS NOTES
[address] [place] [address] AND SPORTS MEDICINE  [address] [address]  Dept: [phone]  Dept Fax: [phone]        Ambulatory Follow Up      Subjective:   Nitin Ribera is a 61y.o. year old female who presents to our office today for routine followup regarding her   1. Primary osteoarthritis of left knee    . Chief Complaint   Patient presents with    Knee Pain     L Knee f/u (Last Inj/Asp 3/9/23)       HPI  Dipika Chapman is a 59-year-old female who presents the office today for recheck status post corticosteroid injection left knee by Jane Kruse PA-C on 3/9/2023 as well as institution of aqua therapy and land therapy. The patient notes her knee pain is 85 to 95% better and she is happy with the result. She would [facility]. Review of Systems   Constitutional:  Positive for activity change. Negative for fever. HENT:  Negative for dental problem. Eyes:  Negative for discharge. Respiratory:  Negative for shortness of breath. Cardiovascular:  Negative for chest pain. Gastrointestinal:  Negative for abdominal pain. Genitourinary: Negative. Musculoskeletal:  Positive for arthralgias. Skin:         Negative for rash   Neurological:  Positive for weakness. Psychiatric/Behavioral:  Negative for confusion. I have reviewed the CC, HPI, ROS, PMH, FHX, Social History, and if not present in this note, I have reviewed in the patient's chart. I agree with the documentation provided by other staff and have reviewed their documentation prior to providing my signature indicating agreement. Objective :   Resp 14   Ht 5' 4\" (1.626 m)   Wt 249 lb (112.9 kg)   BMI 42.74 kg/m²  Body mass index is 42.74 kg/m². General: Nitin Ribera is a 61 y.o. female who is alert and oriented and sitting comfortably in our office.   Ortho

## (undated) DEVICE — STOCKINETTE ORTH UNIV W4INXL25YD COT W DISPNS BX PROTOUCH

## (undated) DEVICE — STRAP ARMBRD W1.5XL32IN FOAM STR YET SFT W/ HK AND LOOP

## (undated) DEVICE — GLOVE SURG SZ 75 L12IN FNGR THK87MIL WHT LTX FREE

## (undated) DEVICE — COVER,MAYO STAND,XL,STERILE: Brand: MEDLINE

## (undated) DEVICE — GLOVE SURG SZ 85 L12IN FNGR THK13MIL WHT ISOLEX POLYISOPRENE

## (undated) DEVICE — GOWN,SIRUS,NON REINFRCD,LARGE,SET IN SL: Brand: MEDLINE

## (undated) DEVICE — 4-PORT MANIFOLD: Brand: NEPTUNE 2

## (undated) DEVICE — 3M™ WARMING BLANKET, UPPER BODY, 10 PER CASE, 42268: Brand: BAIR HUGGER™

## (undated) DEVICE — TUBING PMP L16FT MAIN DISP FOR AR-6400 AR-6475

## (undated) DEVICE — DRAPE,REIN 53X77,STERILE: Brand: MEDLINE

## (undated) DEVICE — Device

## (undated) DEVICE — PAD COOL W10.9XL11.3IN UNIV REG HOSE WRP ON THER CLD

## (undated) DEVICE — GLOVE SURG SZ 65 L12IN FNGR THK87MIL WHT LTX FREE

## (undated) DEVICE — CANISTER, RIGID, 1200CC: Brand: MEDLINE INDUSTRIES, INC.

## (undated) DEVICE — [AGGRESSIVE PLUS CUTTER, ARTHROSCOPIC SHAVER BLADE,  DO NOT RESTERILIZE,  DO NOT USE IF PACKAGE IS DAMAGED,  KEEP DRY,  KEEP AWAY FROM SUNLIGHT]: Brand: FORMULA

## (undated) DEVICE — GARMENT,MEDLINE,DVT,INT,CALF,MED, GEN2: Brand: MEDLINE

## (undated) DEVICE — [STANDARD 12-FLUTE BARREL BUR, ARTHROSCOPIC SHAVER BLADE,  DO NOT RESTERILIZE,  DO NOT USE IF PACKAGE IS DAMAGED,  KEEP DRY,  KEEP AWAY FROM SUNLIGHT]: Brand: FORMULA

## (undated) DEVICE — GLOVE SURG SZ 8 L12IN FNGR THK87MIL WHT LTX FREE

## (undated) DEVICE — CHLORAPREP 26ML ORANGE

## (undated) DEVICE — GOWN,AURORA,NON-REINFORCED,2XL: Brand: MEDLINE

## (undated) DEVICE — TUBING FLD MGMT Y DBL SPIK DUALWAVE

## (undated) DEVICE — ZIMMER® STERILE DISPOSABLE TOURNIQUET CUFF WITH PLC, DUAL PORT, SINGLE BLADDER, 42 IN. (107 CM)

## (undated) DEVICE — DRAPE,U/ SHT,SPLIT,PLAS,STERIL: Brand: MEDLINE

## (undated) DEVICE — TOWEL,OR,DSP,ST,BLUE,STD,4/PK,20PK/CS: Brand: MEDLINE

## (undated) DEVICE — DRESSING PETRO W3XL8IN OIL EMUL N ADH GZ KNIT IMPREG CELOS

## (undated) DEVICE — DISC SUCT FLR DLX QUICKSUITE